# Patient Record
(demographics unavailable — no encounter records)

---

## 2024-12-19 NOTE — PHYSICAL EXAM
[General Appearance - Alert] : alert [General Appearance - In No Acute Distress] : in no acute distress [Sclera] : the sclera and conjunctiva were normal [Extraocular Movements] : extraocular movements were intact [Outer Ear] : the ears and nose were normal in appearance [Hearing Threshold Finger Rub Not [place]] : hearing was normal [Neck Appearance] : the appearance of the neck was normal [] : no respiratory distress [Respiration, Rhythm And Depth] : normal respiratory rhythm and effort [Heart Rate And Rhythm] : heart rate was normal and rhythm regular [Murmurs] : no murmurs [Edema] : there was no peripheral edema [Bowel Sounds] : normal bowel sounds [Abdomen Soft] : soft [Scleral Icterus] : No Scleral Icterus [Abdominal Bruit] : no abdominal bruit [Abdominal  Ascites] : no ascites [Asterixis] : no asterixis observed [Jaundice] : No jaundice [Depression] : no depression [Hallucinations] : ~T no ~M hallucinations

## 2024-12-19 NOTE — PHYSICAL EXAM
[General Appearance - Alert] : alert [General Appearance - In No Acute Distress] : in no acute distress [Sclera] : the sclera and conjunctiva were normal [Extraocular Movements] : extraocular movements were intact [Outer Ear] : the ears and nose were normal in appearance [Hearing Threshold Finger Rub Not [place]] : hearing was normal [Neck Appearance] : the appearance of the neck was normal [] : no respiratory distress [Respiration, Rhythm And Depth] : normal respiratory rhythm and effort [Heart Rate And Rhythm] : heart rate was normal and rhythm regular [Murmurs] : no murmurs [Edema] : there was no peripheral edema [Bowel Sounds] : normal bowel sounds [Abdomen Soft] : soft [Scleral Icterus] : No Scleral Icterus [Abdominal Bruit] : no abdominal bruit [Abdominal  Ascites] : no ascites [Asterixis] : no asterixis observed [Jaundice] : No jaundice [Depression] : no depression [Hallucinations] : ~T no ~M hallucinations

## 2024-12-19 NOTE — ASSESSMENT
[FreeTextEntry1] : The patient is a 62-year-old  female with a complex medical history, including a remote history of anorexia/bulimia in her 20s, current alcohol use disorder (last reported use 7 weeks prior to recent hospitalization), WPW syndrome (underwent three ablations), Takotsubo's cardiomyopathy (diagnosed in 2019), and hypertension. 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  Clinically looks better overall- appears recompenmsating.  PLAN I. Cirrhosis: -The patient was counseled on the nature of cirrhosis and its potential complications, including esophageal varices, hepatic encephalopathy, ascites, hepatocellular carcinoma, and liver failure. She was informed about the need for regular imaging every 6 months to screen for liver cancer. Cipro d/c-ed earlier this year.  -Variceal Screening: last EGD performed April 15, 2024: Grade I varices seen. No banding performed -HCC Screening: July 18, 2024: Cirrhotic liver morphology. No MR evidence of liver mass. AFP negative.  -I have recommended follow-up labs prior to next visit.  II. Ascites: -Patient currently taking Furosemide 20mg daily and Spirinolactone 50mg daily. Recommending to take Furosemide as needed for swelling and continue Spirinolactone daily.  -The patient was counseled on adhering to a low sodium diet (<2 grams of sodium per day) by avoiding adding salt to meals, eliminating salty foods from her diet, consuming more home-cooked meals, choosing fresh or frozen vegetables and fruits (not canned), and reading food labels for low sodium options.  III. Low blood pressure: -She was on Midodrine 20 mg three times daily but has self discontinued. Her BP today looks good in the clinic.  IV. Alcohol use disorder (AUD): -The importance of establishing and maintaining alcohol abstinence was discussed at length with the patient today, including potential benefits of abstinence (with possible gradual improvement in liver function over weeks/months) and potential risks including high risk of short-term and longer-term morbidity/mortality with continued alcohol. Maintaining sobriety from alcohol is a crucial step towards improving overall health and well-being.  V. Malnutrition: -Continues to have low weight and BMI: 17. Recommending for her to focus on diet.   VI. Pruritus / Insomnia -Patient denies pruritus - she is taking Hydroxyzine 25mg daily at bedtime.   VII. Heberden's nodes with arthropathy -She c/o bony nodules over DIP joints of fingers and attributes these to taking Cipro medication for SBP prophylaxis. Stable since stopping cipro per patient. Patient has a hx of smoking, look like OA/Heberden's nodes.  VIII. Takotsubo's cardiomyopathy: -Improved EF, cleared by Dr. Bravo.  VIIII. HM -Completed MMR vaccine on June 28. Also received Heplisav -B. - Will need Hep A Vaccine second dose- pt to receive from PCP office.   X. Transplant candidacy: Presented and listed on 8/11/23. MELD 3.0 : 16. ABO A  RTC in 3 months with repeat labs. The patient was examined, and the treatment plan was reviewed in consultation with Dr. Rubio.  Vida Triplett, MSN, FNP-BC Transplant Hepatology Nurse Practitioner [facility] [address] [place] [address] T: [phone] | F: [phone].

## 2024-12-19 NOTE — ASSESSMENT
[FreeTextEntry1] : The patient is a 62-year-old  female with a complex medical history, including a remote history of anorexia/bulimia in her 20s, current alcohol use disorder (last reported use 7 weeks prior to recent hospitalization), WPW syndrome (underwent three ablations), Takotsubo's cardiomyopathy (diagnosed in 2019), and hypertension. 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  Clinically looks better overall- appears recompenmsating.  PLAN I. Cirrhosis: -The patient was counseled on the nature of cirrhosis and its potential complications, including esophageal varices, hepatic encephalopathy, ascites, hepatocellular carcinoma, and liver failure. She was informed about the need for regular imaging every 6 months to screen for liver cancer. Cipro d/c-ed earlier this year.  -Variceal Screening: last EGD performed April 15, 2024: Grade I varices seen. No banding performed -HCC Screening: July 18, 2024: Cirrhotic liver morphology. No MR evidence of liver mass. AFP negative.  -I have recommended follow-up labs prior to next visit.  II. Ascites: -Patient currently taking Furosemide 20mg daily and Spirinolactone 50mg daily. Recommending to take Furosemide as needed for swelling and continue Spirinolactone daily.  -The patient was counseled on adhering to a low sodium diet (<2 grams of sodium per day) by avoiding adding salt to meals, eliminating salty foods from her diet, consuming more home-cooked meals, choosing fresh or frozen vegetables and fruits (not canned), and reading food labels for low sodium options.  III. Low blood pressure: -She was on Midodrine 20 mg three times daily but has self discontinued. Her BP today looks good in the clinic.  IV. Alcohol use disorder (AUD): -The importance of establishing and maintaining alcohol abstinence was discussed at length with the patient today, including potential benefits of abstinence (with possible gradual improvement in liver function over weeks/months) and potential risks including high risk of short-term and longer-term morbidity/mortality with continued alcohol. Maintaining sobriety from alcohol is a crucial step towards improving overall health and well-being.  V. Malnutrition: -Continues to have low weight and BMI: 17. Recommending for her to focus on diet.   VI. Pruritus / Insomnia -Patient denies pruritus - she is taking Hydroxyzine 25mg daily at bedtime.   VII. Heberden's nodes with arthropathy -She c/o bony nodules over DIP joints of fingers and attributes these to taking Cipro medication for SBP prophylaxis. Stable since stopping cipro per patient. Patient has a hx of smoking, look like OA/Heberden's nodes.  VIII. Takotsubo's cardiomyopathy: -Improved EF, cleared by Dr. Bravo.  VIIII. HM -Completed MMR vaccine on June 28. Also received Heplisav -B. - Will need Hep A Vaccine second dose- pt to receive from PCP office.   X. Transplant candidacy: Presented and listed on 8/11/23. MELD 3.0 : 16. ABO A  RTC in 3 months with repeat labs. The patient was examined, and the treatment plan was reviewed in consultation with Dr. Rubio.  Vida Triplett, MSN, FNP-BC Transplant Hepatology Nurse Practitioner [facility] [address] [place] [address] T: [phone] | F: [phone].

## 2024-12-19 NOTE — DISCUSSION/SUMMARY
[FreeTextEntry1] : Patient is a 62 year-old woman with history as above who presents for cardiac evaluation prior to possible liver transplant.  Cardiac history including WPW (remotely ablated) and stress induced cardiomyopathy (2019). She had right and left heart catheterization in 2023 that were normal.  Will plan to repeat TTE.  [EKG obtained to assist in diagnosis and management of assessed problem(s)] : EKG obtained to assist in diagnosis and management of assessed problem(s)

## 2024-12-19 NOTE — ASSESSMENT
[FreeTextEntry1] : The patient is a 62-year-old  female with a complex medical history, including a remote history of anorexia/bulimia in her 20s, current alcohol use disorder (last reported use 7 weeks prior to recent hospitalization), WPW syndrome (underwent three ablations), Takotsubo's cardiomyopathy (diagnosed in 2019), and hypertension. 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  Clinically looks better overall- appears recompenmsating.  PLAN I. Cirrhosis: -The patient was counseled on the nature of cirrhosis and its potential complications, including esophageal varices, hepatic encephalopathy, ascites, hepatocellular carcinoma, and liver failure. She was informed about the need for regular imaging every 6 months to screen for liver cancer. Cipro d/c-ed earlier this year.  -Variceal Screening: last EGD performed April 15, 2024: Grade I varices seen. No banding performed -HCC Screening: July 18, 2024: Cirrhotic liver morphology. No MR evidence of liver mass. AFP negative.  -I have recommended follow-up labs prior to next visit.  II. Ascites: -Patient currently taking Furosemide 20mg daily and Spirinolactone 50mg daily. Recommending to take Furosemide as needed for swelling and continue Spirinolactone daily.  -The patient was counseled on adhering to a low sodium diet (<2 grams of sodium per day) by avoiding adding salt to meals, eliminating salty foods from her diet, consuming more home-cooked meals, choosing fresh or frozen vegetables and fruits (not canned), and reading food labels for low sodium options.  III. Low blood pressure: -She was on Midodrine 20 mg three times daily but has self discontinued. Her BP today looks good in the clinic.  IV. Alcohol use disorder (AUD): -The importance of establishing and maintaining alcohol abstinence was discussed at length with the patient today, including potential benefits of abstinence (with possible gradual improvement in liver function over weeks/months) and potential risks including high risk of short-term and longer-term morbidity/mortality with continued alcohol. Maintaining sobriety from alcohol is a crucial step towards improving overall health and well-being.  V. Malnutrition: -Continues to have low weight and BMI: 17. Recommending for her to focus on diet.   VI. Pruritus / Insomnia -Patient denies pruritus - she is taking Hydroxyzine 25mg daily at bedtime.   VII. Heberden's nodes with arthropathy -She c/o bony nodules over DIP joints of fingers and attributes these to taking Cipro medication for SBP prophylaxis. Stable since stopping cipro per patient. Patient has a hx of smoking, look like OA/Heberden's nodes.  VIII. Takotsubo's cardiomyopathy: -Improved EF, cleared by Dr. Bravo.  VIIII. HM -Completed MMR vaccine on June 28. Also received Heplisav -B. - Will need Hep A Vaccine second dose- pt to receive from PCP office.   X. Transplant candidacy: Presented and listed on 8/11/23. MELD 3.0 : 16. ABO A  RTC in 3 months with repeat labs. The patient was examined, and the treatment plan was reviewed in consultation with Dr. Rubio.  Vida Triplett, MSN, FNP-BC Transplant Hepatology Nurse Practitioner [facility] [address] [place] [address] T: [phone] | F: [phone].

## 2024-12-19 NOTE — HISTORY OF PRESENT ILLNESS
[Alcoholic Liver Disease] : Alcoholic Liver Disease [Ascites] : Ascites [Non-Bleeding Varices] : Non-Bleeding Varices [History of HCC] : No history of hepatocellular carcinoma [FreeTextEntry1] : Ms. JAE SWARTZ a 62 year White female presents today for a hepatology appointment. She has been a patient of GINA SALAS.  She had a past medical history of anorexia/bulimia (since her 20"s). She has a history of alcohol use disorder, with her last reported use being 7 weeks prior to her recent hospitalization. Other notable medical conditions include WPW syndrome (underwent three ablations), Takotsubo's cardiomyopathy (diagnosed in 2019), and hypertension.  The patient's course was complicated with with decompensated alcohol-associated cirrhosis and alcohol-associated hepatitis, which were further complicated by ascites, non-bleeding esophageal varices, acute kidney injury (SPENCER), now resolved, and sarcopenia. She received treatment with Zosyn for pneumonia during her hospitalization, which was complicated by a probable drug rash. She also had chronic hypotension. To maintain stable diuresis in the context of chronic hypotension, the patient required midodrine 20 mg orally every 8 hours, now with good BP. She is also taking furosemide 40 mg orally every other day and spironolactone 100 mg orally daily, with overall stable renal function and minimal ascites observed during the examination. Left heart catheterization performed on 4/28 yielded negative results, while a recent right heart catheterization on 5/2 indicated normal pulmonary pressures.  The patient's blood type is ABO A. She is currently wait-listed for OLT. She was severely malnourished and has been improving with nutrition and PT intervention. She feels she has more energy, and has gained some weight.  Following is a summary of diagnostic studies, revealing the following: -An MR Abdomen from July 2024 reveal cirrhotic liver without focal suspicious hepatic mass. -An esophagogastroduodenoscopy (EGD) performed on April 25, 2023, revealed Grade I-II esophageal varices (EV) and portal hypertensive gastropathy (PHG). -A colonoscopy conducted on April 25, 2023, showed a normal colon throughout the examination. -An echocardiogram on May 1, 2023, indicated an ejection fraction (EF) of 66%, with mild mitral regurgitation (MR), moderate tricuspid regurgitation (TR), a pulmonary artery systolic pressure (PASP) of 39, and normal left ventricular systolic function (LVSF). -Left heart catheterization (LHC) performed on April 28, 2023, demonstrated normal coronary arteries. -Right heart catheterization (RHC) on May 2, 2023, reported a pulmonary artery (PA) pressure of 26/9, with a mean pressure of 17. - A transthoracic echocardiography (TTE) showed elevated PASP, while RHC on May 2, 2023, revealed normal pulmonary capillary wedge pressure (PCWP), normal pulmonary pressures, and normal right ventricular (RV) pressures.  -A Pap smear conducted on April 26, 2023, yielded negative results. -A breast ultrasound on April 25, 2023, showed no abnormalities in the breast tissue.  Interval hx  10/19/2024:  She presents for a follow-up at hepatology alone unaccompanied. She reports doing well. She has recovered from her shoulder injury. She states that she doesnt need PT at this time. She has gained a total of 7 lbs since her last visit, a total of 17 lbs since May. She complains of pain in bilateral wrists and fingers, and notes bony prominences on DIP joints of fingers. She denies abdominal pain, N/V/C/D. She denies edema, abdominal distention, and fluid overload.  Interval hx 1/24/2024: She is accompanied at today's visit. Her MELD score is 20, and MELD Na is 18. Her labs are as follows: Sodium 133, Potassium 4.1, BUN 33, Creatinine 1.49, bilirubin 2.3, platelets 232, Hemoglobin 9, INR 1.2, Albumin 4.4. She notes that the bony prominences on the DIP joints of her fingers have improved since stopping Cipro. She does complain of fluid buildup on the left elbow (bursitis vs cyst), but is not interested in drainage as it does not bother her. She denies experiencing abdominal pain, nausea, vomiting, constipation, or diarrhea. Additionally, she denies edema, abdominal distention, and fluid overload.  June 12, 2024: The patient returns for a hepatology follow-up appointment. She reports overall doing well with no new complaints today. I reviewed recent blood test results from April 16, 2024, showing a white cell count of 5.5, hemoglobin 10.5 g/dL, and a platelet count of 217,000. Her serum creatinine is 1.5 mg/dL. Recent liver tests revealed total bilirubin at 1.5 mg/dL, AST at 49, ALT at 23, and alkaline phosphatase at 91. INR is 1.2. Recent EGD on April 15, 2024 revealed, Grade I esophageal varices. One recently bleeding angioectasia in the stomach, treated with argon plasma coagulation (APC). One non-bleeding angioectasia in the stomach, treated with argon plasma coagulation (APC). Portal hypertensive gastropathy. Small (< 5 mm) duodenal varices or thickened fold noted at the junction of the first and second portions of the duodenum. The patient is getting her teeth removed tomorrow morning, and I have recommended antibiotics prophylaxis.   Sept 18, 2024: She is doing well overall with a MELD score of 17. Labs from September 16, 2024, show mostly stable results: creatinine 1.3, glucose 104, platelets 146, hemoglobin 12.2, bilirubin 2.7, AST 53, ALT 29, , and INR 1.3. AFP is pending. An MRI from July 18, 2024, revealed a cirrhotic liver with no focal suspicious hepatic mass. The last EGD was on April 15, 2024. Medications include Midodrine 10 mg (2 tablets three times daily), Aldactone 50 mg daily (since 9/9), Lasix 20 mg (1 tablet daily), Thiamine, Vitamin C, Folic acid, Hydroxyzine, Vitamin D, and magnesium oxide (MgO), and Ferrous Sulfate. Ciprofloxacin was discontinued on December 22, 2023.  December 19, 2024: The patient returns for routine hepatology appointment. MELD: 16 based on labs from Dec 16. Labs from December 16, 2024 show mostly stable results: creatinine 1.32, glucose 120, platelets 153, hemoglobin 11.6, bilirubin 2.4, AST 52, ALT 28, , and INR 1.2. AFP is pending. An MRI from July 18, 2024, revealed a cirrhotic liver with no focal suspicious hepatic mass. The last EGD was on April 15, 2024. Medications include Midodrine 10 mg (2 tablets three times daily), Aldactone 50 mg daily (since 9/9), Lasix 20 mg (1 tablet daily), Thiamine, Vitamin C, Folic acid, Hydroxyzine, Vitamin D, and magnesium oxide (MgO), and Ferrous Sulfate. Ciprofloxacin was discontinued on December 22, 2023.

## 2024-12-19 NOTE — HISTORY OF PRESENT ILLNESS
[FreeTextEntry1] : Patient is a 61 year-old white woman with known past medical history of anorexia/bulimia (since her 20s), cirrhosis secondary to alcohol use disorder, last drink in March 2023, former smoker, last cigarette in April 2023, WPW status post multiple ablations (2001), stress induced cardiomyopathy (2019), hypertension, presents today for cardiac evaluation prior to consideration for liver transplant.  Her last testing was performed more than a year ago, including right and left heart catheterization.   She does not exercise, but she remains active.  She has no palpitations. No syncope.  She denies any chest pain, shortness of breath, or lower extremity edema.

## 2024-12-19 NOTE — REASON FOR VISIT
[Other: ____] : [unfilled] [FreeTextEntry1] : December 2024 - Patient returns today for follow-up in her usual state of health. She has no new cardiovascular complaints.  She continues to abstain from cigarettes and EtOH. She remains on midodrine for BP support, but she does not require it three times per day.

## 2024-12-19 NOTE — PHYSICAL EXAM
[General Appearance - Alert] : alert [General Appearance - In No Acute Distress] : in no acute distress [Sclera] : the sclera and conjunctiva were normal [Extraocular Movements] : extraocular movements were intact [Outer Ear] : the ears and nose were normal in appearance [Hearing Threshold Finger Rub Not [place]] : hearing was normal [Neck Appearance] : the appearance of the neck was normal [] : no respiratory distress [Respiration, Rhythm And Depth] : normal respiratory rhythm and effort [Heart Rate And Rhythm] : heart rate was normal and rhythm regular [Murmurs] : no murmurs [Edema] : there was no peripheral edema [Bowel Sounds] : normal bowel sounds [Abdomen Soft] : soft [Scleral Icterus] : No Scleral Icterus [Abdominal Bruit] : no abdominal bruit [Abdominal  Ascites] : no ascites [Asterixis] : no asterixis observed [Jaundice] : No jaundice [Depression] : no depression [Hallucinations] : ~T no ~M hallucinations

## 2025-05-12 NOTE — CARDIOLOGY SUMMARY
[de-identified] : 6/26/2024 - sinus bradycardia at 51 bpm, low voltage in limb leads [de-identified] : 5/1/2023 - normal LA, estimated PASP 39 mmHg, normal RV size and function, normal LV systolic function, LVEF 66% 7/13/2024 - normal LA, normal pulmonary pressures, PASP 20 mmHg, normal RV size and function, normal LV systolic function, LVEF 68% [de-identified] : WPW status post multiple ablations in 2001 at [facility] [de-identified] : 4/28/2023 left heart cath - normal coronary arteries with no obstructive disease 5/2/2023 right heart cath with juan alberto cardiac index at 4.75 and normal filling pressures, PCWP 10 mmHg, RA 5 mmHg 12-May-2025